# Patient Record
Sex: FEMALE | Race: OTHER | NOT HISPANIC OR LATINO
[De-identification: names, ages, dates, MRNs, and addresses within clinical notes are randomized per-mention and may not be internally consistent; named-entity substitution may affect disease eponyms.]

---

## 2020-01-02 ENCOUNTER — RESULT REVIEW (OUTPATIENT)
Age: 40
End: 2020-01-02

## 2020-05-15 PROBLEM — Z00.00 ENCOUNTER FOR PREVENTIVE HEALTH EXAMINATION: Status: ACTIVE | Noted: 2020-05-15

## 2020-05-20 ENCOUNTER — APPOINTMENT (OUTPATIENT)
Dept: BARIATRICS | Facility: CLINIC | Age: 40
End: 2020-05-20
Payer: COMMERCIAL

## 2020-05-20 VITALS — HEIGHT: 72 IN | WEIGHT: 293 LBS | BODY MASS INDEX: 39.68 KG/M2

## 2020-05-20 DIAGNOSIS — Z78.9 OTHER SPECIFIED HEALTH STATUS: ICD-10-CM

## 2020-05-20 DIAGNOSIS — G47.30 SLEEP APNEA, UNSPECIFIED: ICD-10-CM

## 2020-05-20 PROCEDURE — 99203 OFFICE O/P NEW LOW 30 MIN: CPT | Mod: 95

## 2020-05-20 NOTE — REVIEW OF SYSTEMS
[Recent Change In Weight] : ~T no recent weight change [Reflux/Heartburn] : reflux/heartburn [Negative] : Psychiatric [FreeTextEntry7] : hx of diverticulitis

## 2020-05-20 NOTE — HISTORY OF PRESENT ILLNESS
[Home] : at home, [unfilled] , at the time of the visit. [Other Location: e.g. Home (Enter Location, City,State)___] : at [unfilled] [Verbal consent obtained from patient] : the patient, [unfilled] [FreeTextEntry3] : self [de-identified] : 40 yo female who is seen by catia who is very intelligent history of diverticulitis who has clas 3 obesity that has gotten worse since last pregnancy.  Followed by Dr Kamara for 3 years who has been advising with weight loss along with PA.  They have placed her on phentermine without success and have tracked her diet for many months.  She has combined the meds with a variety of diets ranging from intermittent fasting to low calorie options.  She tracke her weight gain to following her iud insertion after her last child\par she walks regularly and is very committed.  There is nothing to be gained by further insurance mandated diet.  She has been considering surgery for long time and oriiginal referral was in october.  She has some signs of sleep apnea but no narcolepsy.  Does have gerd symptoms so will get egd

## 2020-05-20 NOTE — ASSESSMENT
[FreeTextEntry1] : for bariatric surgery \par get egd to check for reflux and esophagitis\par labs ordered Psych and nutrtion

## 2020-06-05 ENCOUNTER — APPOINTMENT (OUTPATIENT)
Dept: BARIATRICS | Facility: CLINIC | Age: 40
End: 2020-06-05
Payer: COMMERCIAL

## 2020-06-05 VITALS — BODY MASS INDEX: 40.69 KG/M2 | WEIGHT: 293 LBS

## 2020-06-05 PROCEDURE — 97802 MEDICAL NUTRITION INDIV IN: CPT | Mod: 95

## 2020-06-09 ENCOUNTER — TRANSCRIPTION ENCOUNTER (OUTPATIENT)
Age: 40
End: 2020-06-09

## 2020-06-24 ENCOUNTER — APPOINTMENT (OUTPATIENT)
Dept: BARIATRICS | Facility: CLINIC | Age: 40
End: 2020-06-24
Payer: COMMERCIAL

## 2020-06-24 VITALS — BODY MASS INDEX: 41.77 KG/M2 | WEIGHT: 293 LBS

## 2020-06-24 PROCEDURE — 97803 MED NUTRITION INDIV SUBSEQ: CPT | Mod: 95

## 2020-07-13 ENCOUNTER — FORM ENCOUNTER (OUTPATIENT)
Age: 40
End: 2020-07-13

## 2020-07-20 ENCOUNTER — LABORATORY RESULT (OUTPATIENT)
Age: 40
End: 2020-07-20

## 2020-07-21 VITALS — BODY MASS INDEX: 41.23 KG/M2 | WEIGHT: 293 LBS

## 2020-07-22 ENCOUNTER — APPOINTMENT (OUTPATIENT)
Dept: BARIATRICS | Facility: CLINIC | Age: 40
End: 2020-07-22
Payer: COMMERCIAL

## 2020-07-22 VITALS — WEIGHT: 293 LBS | BODY MASS INDEX: 39.68 KG/M2 | HEIGHT: 72 IN

## 2020-07-22 PROCEDURE — 99213 OFFICE O/P EST LOW 20 MIN: CPT

## 2020-07-22 NOTE — HISTORY OF PRESENT ILLNESS
[de-identified] : 38 yo female who is seen by catia who is very intelligent history of diverticulitis who has clas 3 obesity that has gotten worse since last pregnancy.  Followed by Dr Kamara for 3 years who has been advising with weight loss along with PA.  They have placed her on phentermine without success and have tracked her diet for many months.  She has combined the meds with a variety of diets ranging from intermittent fasting to low calorie options.  She tracke her weight gain to following her iud insertion after her last child\par she walks regularly and is very committed.  There is nothing to be gained by further insurance mandated diet.  She has been considering surgery for long time and oriiginal referral was in october.  She has some signs of sleep apnea but no narcolepsy.  egd no barretts\par for lap vsg \par all instructions given and questions answered\par risks and benefits explained

## 2020-07-24 ENCOUNTER — LABORATORY RESULT (OUTPATIENT)
Age: 40
End: 2020-07-24

## 2020-07-26 ENCOUNTER — TRANSCRIPTION ENCOUNTER (OUTPATIENT)
Age: 40
End: 2020-07-26

## 2020-07-27 ENCOUNTER — APPOINTMENT (OUTPATIENT)
Dept: BARIATRICS | Facility: HOSPITAL | Age: 40
End: 2020-07-27

## 2020-07-27 ENCOUNTER — INPATIENT (INPATIENT)
Facility: HOSPITAL | Age: 40
LOS: 0 days | Discharge: HOME | DRG: 621 | End: 2020-07-28
Attending: SURGERY | Admitting: SURGERY
Payer: COMMERCIAL

## 2020-07-27 ENCOUNTER — RESULT REVIEW (OUTPATIENT)
Age: 40
End: 2020-07-27

## 2020-07-27 VITALS
DIASTOLIC BLOOD PRESSURE: 83 MMHG | HEART RATE: 101 BPM | OXYGEN SATURATION: 98 % | SYSTOLIC BLOOD PRESSURE: 128 MMHG | RESPIRATION RATE: 18 BRPM | TEMPERATURE: 97 F | HEIGHT: 72 IN | WEIGHT: 293 LBS

## 2020-07-27 DIAGNOSIS — Z41.9 ENCOUNTER FOR PROCEDURE FOR PURPOSES OTHER THAN REMEDYING HEALTH STATE, UNSPECIFIED: Chronic | ICD-10-CM

## 2020-07-27 PROCEDURE — 88342 IMHCHEM/IMCYTCHM 1ST ANTB: CPT | Mod: 26

## 2020-07-27 PROCEDURE — 43775 LAP SLEEVE GASTRECTOMY: CPT

## 2020-07-27 PROCEDURE — 39541 REPAIR OF DIAPHRAGM HERNIA: CPT

## 2020-07-27 PROCEDURE — 88307 TISSUE EXAM BY PATHOLOGIST: CPT | Mod: 26

## 2020-07-27 RX ORDER — GABAPENTIN 400 MG/1
300 CAPSULE ORAL ONCE
Refills: 0 | Status: COMPLETED | OUTPATIENT
Start: 2020-07-27 | End: 2020-07-27

## 2020-07-27 RX ORDER — ONDANSETRON 8 MG/1
4 TABLET, FILM COATED ORAL EVERY 6 HOURS
Refills: 0 | Status: DISCONTINUED | OUTPATIENT
Start: 2020-07-27 | End: 2020-07-28

## 2020-07-27 RX ORDER — KETOROLAC TROMETHAMINE 30 MG/ML
30 SYRINGE (ML) INJECTION EVERY 6 HOURS
Refills: 0 | Status: DISCONTINUED | OUTPATIENT
Start: 2020-07-27 | End: 2020-07-28

## 2020-07-27 RX ORDER — ACETAMINOPHEN 500 MG
1000 TABLET ORAL ONCE
Refills: 0 | Status: COMPLETED | OUTPATIENT
Start: 2020-07-27 | End: 2020-07-27

## 2020-07-27 RX ORDER — SCOPALAMINE 1 MG/3D
1 PATCH, EXTENDED RELEASE TRANSDERMAL ONCE
Refills: 0 | Status: COMPLETED | OUTPATIENT
Start: 2020-07-27 | End: 2020-07-27

## 2020-07-27 RX ORDER — ACETAMINOPHEN 500 MG
650 TABLET ORAL EVERY 6 HOURS
Refills: 0 | Status: DISCONTINUED | OUTPATIENT
Start: 2020-07-27 | End: 2020-07-28

## 2020-07-27 RX ORDER — ENOXAPARIN SODIUM 100 MG/ML
40 INJECTION SUBCUTANEOUS ONCE
Refills: 0 | Status: COMPLETED | OUTPATIENT
Start: 2020-07-27 | End: 2020-07-27

## 2020-07-27 RX ORDER — SODIUM CHLORIDE 9 MG/ML
1000 INJECTION, SOLUTION INTRAVENOUS
Refills: 0 | Status: DISCONTINUED | OUTPATIENT
Start: 2020-07-27 | End: 2020-07-28

## 2020-07-27 RX ORDER — HYDROMORPHONE HYDROCHLORIDE 2 MG/ML
0.5 INJECTION INTRAMUSCULAR; INTRAVENOUS; SUBCUTANEOUS
Refills: 0 | Status: DISCONTINUED | OUTPATIENT
Start: 2020-07-27 | End: 2020-07-27

## 2020-07-27 RX ORDER — KETOROLAC TROMETHAMINE 30 MG/ML
15 SYRINGE (ML) INJECTION ONCE
Refills: 0 | Status: DISCONTINUED | OUTPATIENT
Start: 2020-07-27 | End: 2020-07-27

## 2020-07-27 RX ADMIN — Medication 1000 MILLIGRAM(S): at 22:41

## 2020-07-27 RX ADMIN — Medication 1000 MILLIGRAM(S): at 09:36

## 2020-07-27 RX ADMIN — Medication 650 MILLIGRAM(S): at 16:03

## 2020-07-27 RX ADMIN — HYDROMORPHONE HYDROCHLORIDE 0.5 MILLIGRAM(S): 2 INJECTION INTRAMUSCULAR; INTRAVENOUS; SUBCUTANEOUS at 15:00

## 2020-07-27 RX ADMIN — Medication 650 MILLIGRAM(S): at 15:35

## 2020-07-27 RX ADMIN — SODIUM CHLORIDE 180 MILLILITER(S): 9 INJECTION, SOLUTION INTRAVENOUS at 15:03

## 2020-07-27 RX ADMIN — SCOPALAMINE 1 PATCH: 1 PATCH, EXTENDED RELEASE TRANSDERMAL at 09:37

## 2020-07-27 RX ADMIN — ENOXAPARIN SODIUM 40 MILLIGRAM(S): 100 INJECTION SUBCUTANEOUS at 09:36

## 2020-07-27 RX ADMIN — GABAPENTIN 300 MILLIGRAM(S): 400 CAPSULE ORAL at 09:36

## 2020-07-27 RX ADMIN — Medication 15 MILLIGRAM(S): at 16:03

## 2020-07-27 RX ADMIN — HYDROMORPHONE HYDROCHLORIDE 0.5 MILLIGRAM(S): 2 INJECTION INTRAMUSCULAR; INTRAVENOUS; SUBCUTANEOUS at 14:45

## 2020-07-27 RX ADMIN — SCOPALAMINE 1 PATCH: 1 PATCH, EXTENDED RELEASE TRANSDERMAL at 20:08

## 2020-07-27 RX ADMIN — Medication 400 MILLIGRAM(S): at 20:50

## 2020-07-27 RX ADMIN — Medication 15 MILLIGRAM(S): at 15:30

## 2020-07-27 RX ADMIN — SODIUM CHLORIDE 180 MILLILITER(S): 9 INJECTION, SOLUTION INTRAVENOUS at 20:50

## 2020-07-27 RX ADMIN — ONDANSETRON 4 MILLIGRAM(S): 8 TABLET, FILM COATED ORAL at 20:50

## 2020-07-27 NOTE — PROGRESS NOTE ADULT - ASSESSMENT
40F w/ hx MO, GERD, miscarriage s/p D&C, no abdominal surgical hx, presents for laparoscopic sleeve gastrectomy and hiatal hernia repair    - nausea/pain control  -BCLD  Protonix  SCd's, OOBA, IS  AM labs  ISS  Nutritional consult AM

## 2020-07-27 NOTE — BRIEF OPERATIVE NOTE - NSICDXBRIEFPROCEDURE_GEN_ALL_CORE_FT
PROCEDURES:  Laparoscopic repair of sliding hiatal hernia 27-Jul-2020 14:09:18  Hawk Mcintosh  Gastrectomy, sleeve 27-Jul-2020 14:08:14  Hawk Mcintosh

## 2020-07-27 NOTE — BRIEF OPERATIVE NOTE - OPERATION/FINDINGS
Stomach divided along Bougie edge using buttressed Covidien stapler. Hiatal hernia repaired w/ proline. Hemostasis achieved. Fascia at 15mm post site closed. Port sites closed w/ 4-0 Monocryl sutures & surgical glue.

## 2020-07-27 NOTE — H&P ADULT - HISTORY OF PRESENT ILLNESS
40F w/ hx MO, GERD, miscarriage s/p D&C, no abdominal surgical hx, presents for laparoscopic sleeve gastrectomy. Denies any complaints at this time.

## 2020-07-27 NOTE — PROGRESS NOTE ADULT - SUBJECTIVE AND OBJECTIVE BOX
POST-OPERATIVE NOTE    Procedure: Sleeve gastrectomy and hiatal hernia repair    Diagnosis/Indication: MO and hiatal hernia    Surgeon: Dr. Espitia     S: Pt has no complaints besides been a little anxious. Denies nausea, vomit, CP, SOB, REID, calf tenderness. Pain controlled with medication.    O:  T(C): 36.8 (07-27-20 @ 16:05), Max: 36.8 (07-27-20 @ 16:05)  T(F): 98.2 (07-27-20 @ 16:05), Max: 98.2 (07-27-20 @ 16:05)  HR: 99 (07-27-20 @ 16:05) (95 - 103)  BP: 155/81 (07-27-20 @ 16:05) (135/77 - 159/88)  RR: 80 (07-27-20 @ 16:05) (11 - 80)  SpO2: 100% (07-27-20 @ 16:05) (94% - 100%)  Wt(kg): --            Gen: NAD, resting comfortably in bed  C/V: NSR  Pulm: Nonlabored breathing, no respiratory distress  Abd: soft, NT/ND  Extrem: WWP, no calf edema, SCDs in place

## 2020-07-27 NOTE — BRIEF OPERATIVE NOTE - NSICDXBRIEFPOSTOP_GEN_ALL_CORE_FT
POST-OP DIAGNOSIS:  Hiatal hernia 27-Jul-2020 14:09:32  Hawk Mcintosh  Morbid obesity 27-Jul-2020 14:08:34  Hawk Mcintosh

## 2020-07-27 NOTE — H&P ADULT - BIRTH SEX
Patient:   MARISSA CORBIN            MRN: [id number]            FIN: [id number]               Age:   4 years     Sex:  MALE     :  13   Associated Diagnoses:   None   Author:   JACI JIMENEZ      Discharge Summary    Admission Date:  9/15/17    Discharge Date: 17    Admitting Diagnoses:  URI with bronchospasm      Final Diagnoses:  viral upper respiratory tract infections with moderate bronchospasm; acute respiratory failure      Pertinent Labs/Imaging Findings:   9/15 XR CHEST PORTABLE 1V  IMPRESSION: Normal heart size and lung volumes. Lungs are clear. No effusion or pneumothorax. Normal bones          Hospital Course:  Marissa is a 4 year old previously healthy male presenting with 3 days of viral URI symptoms and one day of wheezing and increased work of breathing. His mother states that he has had cough, sneezing, and intermittent posttussive emesis for the past 3 days. Denies fevers. One day prior to admission, he began to have wheezing sounds and grunting, so his mother brought him to the ER. His mother has a stomach virus last week, but no other sick contacts. No recent travel. He has generally been eating and drinking well with slightly decreased PO intake the day of admission. Denies nausea/vomiting/diarrhea rash. He complained of sore throat for 2-3 days, but currently symptoms have resolved.  In the ER, he presented with wheezing and increased WOB, with RR in 40-50s. He received 1 hr long Duoneb followed by 1 hr long Albuterol then spaced to q2h. Received prednisolone 50 mg x 1. CXR was negative. He was then admitted to the floor for continued respiratory management. On the floor, patient successfully weaned to Albuterol Q3hr at 11am, and then Q4hr at 2pm. Patient's behaving hyperactive per parents.   Patient remained stable and tolerated Q4 hr albuterol.   Patient symptoms improved and patient reported back to baseline.   Patient clinically stable for discharge home  with close outpatient follow up with PMD       Condition on Discharge:   Stable    Discharge Instructions:   PMD and Follow Up Appointment: Dr. Dyana Layne in 1-4 days      Consultants and Consultant Discharge Recs/Follow Up Appoinments:      Labs/Imaging to be done or followed up as outpatient:      Medications:  Albuterol, 4 puffs every 4 hrs while awake for 2 more days  Orapred 30 mg orally twice a day for 2-3 more days     Diet:  Resume previous    Activity:  No restrictions, resume previous as tolerated    Special Instructions:  Immediately go to Emergency Room if wheezing worsens, is no longer improved with medication, his lips or fingers turns blue, has difficulty eating or drinking, has severe chest pain, or coughing becomes constant.   Call Pediatrician if new symptoms occur.    Discharge Summary faxed to PMD. Thank you for allowing us to participate in the care of this patient.                 Electronically Signed On 09/17/2017 19:57  __________________________________________________   JACI JIMENEZ     Female

## 2020-07-27 NOTE — H&P ADULT - ASSESSMENT
40F w/ hx MO, GERD, miscarriage s/p D&C, no abdominal surgical hx, presents for laparoscopic sleeve gastrectomy.     - proceed to surgery

## 2020-07-28 ENCOUNTER — TRANSCRIPTION ENCOUNTER (OUTPATIENT)
Age: 40
End: 2020-07-28

## 2020-07-28 VITALS — WEIGHT: 159.84 LBS

## 2020-07-28 LAB
ANION GAP SERPL CALC-SCNC: 10 MMOL/L — SIGNIFICANT CHANGE UP (ref 5–17)
BUN SERPL-MCNC: 9 MG/DL — SIGNIFICANT CHANGE UP (ref 7–23)
CALCIUM SERPL-MCNC: 8.9 MG/DL — SIGNIFICANT CHANGE UP (ref 8.4–10.5)
CHLORIDE SERPL-SCNC: 100 MMOL/L — SIGNIFICANT CHANGE UP (ref 96–108)
CO2 SERPL-SCNC: 23 MMOL/L — SIGNIFICANT CHANGE UP (ref 22–31)
CREAT SERPL-MCNC: 0.95 MG/DL — SIGNIFICANT CHANGE UP (ref 0.5–1.3)
GLUCOSE SERPL-MCNC: 109 MG/DL — HIGH (ref 70–99)
HCT VFR BLD CALC: 39.1 % — SIGNIFICANT CHANGE UP (ref 34.5–45)
HGB BLD-MCNC: 13.1 G/DL — SIGNIFICANT CHANGE UP (ref 11.5–15.5)
MAGNESIUM SERPL-MCNC: 1.8 MG/DL — SIGNIFICANT CHANGE UP (ref 1.6–2.6)
MCHC RBC-ENTMCNC: 30 PG — SIGNIFICANT CHANGE UP (ref 27–34)
MCHC RBC-ENTMCNC: 33.5 GM/DL — SIGNIFICANT CHANGE UP (ref 32–36)
MCV RBC AUTO: 89.5 FL — SIGNIFICANT CHANGE UP (ref 80–100)
NRBC # BLD: 0 /100 WBCS — SIGNIFICANT CHANGE UP (ref 0–0)
PHOSPHATE SERPL-MCNC: 3.1 MG/DL — SIGNIFICANT CHANGE UP (ref 2.5–4.5)
PLATELET # BLD AUTO: 202 K/UL — SIGNIFICANT CHANGE UP (ref 150–400)
POTASSIUM SERPL-MCNC: 4.2 MMOL/L — SIGNIFICANT CHANGE UP (ref 3.5–5.3)
POTASSIUM SERPL-SCNC: 4.2 MMOL/L — SIGNIFICANT CHANGE UP (ref 3.5–5.3)
RBC # BLD: 4.37 M/UL — SIGNIFICANT CHANGE UP (ref 3.8–5.2)
RBC # FLD: 13 % — SIGNIFICANT CHANGE UP (ref 10.3–14.5)
SODIUM SERPL-SCNC: 133 MMOL/L — LOW (ref 135–145)
WBC # BLD: 8.66 K/UL — SIGNIFICANT CHANGE UP (ref 3.8–10.5)
WBC # FLD AUTO: 8.66 K/UL — SIGNIFICANT CHANGE UP (ref 3.8–10.5)

## 2020-07-28 RX ORDER — PANTOPRAZOLE SODIUM 20 MG/1
40 TABLET, DELAYED RELEASE ORAL DAILY
Refills: 0 | Status: DISCONTINUED | OUTPATIENT
Start: 2020-07-28 | End: 2020-07-28

## 2020-07-28 RX ORDER — OMEPRAZOLE 10 MG/1
1 CAPSULE, DELAYED RELEASE ORAL
Qty: 30 | Refills: 0
Start: 2020-07-28 | End: 2020-08-26

## 2020-07-28 RX ORDER — APIXABAN 2.5 MG/1
1 TABLET, FILM COATED ORAL
Qty: 60 | Refills: 0
Start: 2020-07-28 | End: 2020-08-26

## 2020-07-28 RX ORDER — PANTOPRAZOLE SODIUM 20 MG/1
1 TABLET, DELAYED RELEASE ORAL
Qty: 0 | Refills: 0 | DISCHARGE

## 2020-07-28 RX ORDER — ACETAMINOPHEN 500 MG
2 TABLET ORAL
Qty: 84 | Refills: 0
Start: 2020-07-28 | End: 2020-08-10

## 2020-07-28 RX ORDER — ACETAMINOPHEN 500 MG
1000 TABLET ORAL ONCE
Refills: 0 | Status: COMPLETED | OUTPATIENT
Start: 2020-07-28 | End: 2020-07-28

## 2020-07-28 RX ORDER — SODIUM CHLORIDE 9 MG/ML
1000 INJECTION, SOLUTION INTRAVENOUS
Refills: 0 | Status: DISCONTINUED | OUTPATIENT
Start: 2020-07-28 | End: 2020-07-28

## 2020-07-28 RX ORDER — METOCLOPRAMIDE HCL 10 MG
10 TABLET ORAL ONCE
Refills: 0 | Status: COMPLETED | OUTPATIENT
Start: 2020-07-28 | End: 2020-07-28

## 2020-07-28 RX ORDER — MAGNESIUM SULFATE 500 MG/ML
2 VIAL (ML) INJECTION ONCE
Refills: 0 | Status: DISCONTINUED | OUTPATIENT
Start: 2020-07-28 | End: 2020-07-28

## 2020-07-28 RX ADMIN — Medication 400 MILLIGRAM(S): at 04:52

## 2020-07-28 RX ADMIN — Medication 30 MILLIGRAM(S): at 07:58

## 2020-07-28 RX ADMIN — ONDANSETRON 4 MILLIGRAM(S): 8 TABLET, FILM COATED ORAL at 07:58

## 2020-07-28 RX ADMIN — SCOPALAMINE 1 PATCH: 1 PATCH, EXTENDED RELEASE TRANSDERMAL at 06:29

## 2020-07-28 RX ADMIN — Medication 10 MILLIGRAM(S): at 00:39

## 2020-07-28 RX ADMIN — Medication 30 MILLIGRAM(S): at 00:52

## 2020-07-28 RX ADMIN — Medication 30 MILLIGRAM(S): at 00:39

## 2020-07-28 RX ADMIN — Medication 1000 MILLIGRAM(S): at 05:49

## 2020-07-28 NOTE — DISCHARGE NOTE PROVIDER - CARE PROVIDER_API CALL
Alejandro Espitia S  SURGERY  [address] [place] [address]  Phone: [phone]  Fax: [phone]  Follow Up Time: Alejandro Espitia S  SURGERY  [address] [place] [address]  Phone: [phone]  Fax: [phone]  Follow Up Time: 1 week

## 2020-07-28 NOTE — DISCHARGE NOTE PROVIDER - NSDCMRMEDTOKEN_GEN_ALL_CORE_FT
Eliquis 2.5 mg oral tablet: 1 tab(s) orally 2 times a day MDD:2 tabs    Start taking Eliquis July 30, 2020 in the morning.   omeprazole 40 mg oral delayed release capsule: 1 cap(s) orally once a day MDD:1 tab  Tylenol 325 mg oral tablet: 2 tab(s) orally every 8 hours, As Needed -for moderate pain for severe pain MDD:4000 mg

## 2020-07-28 NOTE — DISCHARGE NOTE PROVIDER - NSDCCPCAREPLAN_GEN_ALL_CORE_FT
PRINCIPAL DISCHARGE DIAGNOSIS  Diagnosis: Morbidly obese  Assessment and Plan of Treatment: 40F w/ hx MO, GERD, miscarriage s/p D&C, no abdominal surgical hx, presents for laparoscopic sleeve gastrectomy and hiatal hernia repair. Patient tolerated well the procedure that was without complications. Patient is accepting the diet, feeling well, ambulating, without nausea, vomit, CP, SOB, calf tenderness. Patient is stable and ready to be discharged home with a follow up appointment.         SECONDARY DISCHARGE DIAGNOSES  Diagnosis: Hiatal hernia with GERD  Assessment and Plan of Treatment: PRINCIPAL DISCHARGE DIAGNOSIS  Diagnosis: Morbidly obese  Assessment and Plan of Treatment: 40 year old female with history of MO (BMI 43) , GERD, miscarriage s/p D&C, no abdominal surgical hx, presents for elective bariatric surgery. 7/27 patient s/p  laparoscopic sleeve gastrectomy and hiatal hernia repair. Patient tolerated well the procedure that was without complications. Patient is accepting the diet, feeling well, ambulating, without nausea, vomit, CP, SOB, calf tenderness. Patient is stable and ready to be discharged home with a follow up appointment.   1)  Please take Tylenol 650 mg every 4 to 6 hours by mouth for moderate pain control.  Please do not exceed over 4,000 mg of Tylenol a day.   2) Please start taking Eliquis 2.5 mg by mouth twice a day starting Thursday July 30, 2020 for 30 days.  3) Please take Omeprazole 40 mg once a day by mouth.        SECONDARY DISCHARGE DIAGNOSES  Diagnosis: Hiatal hernia with GERD  Assessment and Plan of Treatment:

## 2020-07-28 NOTE — DISCHARGE NOTE NURSING/CASE MANAGEMENT/SOCIAL WORK - PATIENT PORTAL LINK FT
You can access the FollowMyHealth Patient Portal offered by [facility] by registering at the following website: [email or web address]. By joining [facility]’s FollowMyHealth portal, you will also be able to view your health information using other applications (apps) compatible with our system.

## 2020-07-28 NOTE — DIETITIAN INITIAL EVALUATION ADULT. - OTHER INFO
with hx of admitting for elective lap sleeve gastrectomy (7/27), now POD #1. On assessment, pt resting in bed. Currently on BARICLLIQ diet, tolerating PO. Pt had adequate PO intake this morning, consuming 4oz within first hour of waking up. Pain and nausea well controlled. Discussed volumes of various cup sizes on tray table and encouraged aiming for 4 oz/hr as tolerated. Prepared with protein shakes, and vitamins for after discharge. RD provided indepth edu on diet advancement and specific nutrient needs s/p LSG. NKFA. No dietary restrictions at home. Skin: surgical incisions. GI: WDL per flowsheet. RD to follow up per protocol.

## 2020-07-28 NOTE — DIETITIAN INITIAL EVALUATION ADULT. - ENERGY NEEDS
Height: 72" Weight: 317lbs, IBW 160lbs+/-10%, %%, BMI 43.0 kg/m2  Above energy needs calculated for wt maintenance (20-25kcal/kg) using IBW (73kg)  Weeks 1-2 estimated needs: 730-880kcal/day (10-12kcal/kg), 88-110g pro/day (1.2-1.5g/kg), >/=64oz clear fluids.

## 2020-07-28 NOTE — DISCHARGE NOTE PROVIDER - NSDCFUADDINST_GEN_ALL_CORE_FT
Follow up with Dr. Espitia in 1 week. Call the office at  to schedule your appointment.     You may shower; soap and water over incision sites. Do not scrub. Pat dry when done. No tub bathing or swimming until cleared. Keep incision sites out of the sun as scars will darken.     No heavy lifting (>10lbs) or strenuous exercise. Diet: Bariatric Full Fluids. 60 grams protein daily.  64 fluid ounces water daily.   Drink small sips throughout the day. Continue diet as outlined by paperwork received as a pre-operative patient.   You should be urinating at least 3-4x per day.   Call the office if you experience increasing abdominal pain, nausea, vomiting, or temperature >100.4F.    NO ASPIRIN OR NSAIDs until approved by Dr. Espitia.   Avoid alcoholic beverages until cleared by Dr. Espitia.    - Place take Tylenol 325mg 2 tabs by mouth every 6 hours as needed for moderate to severe pain control. Please do not exceed more than 4000 mg of tylenol a day.   - Please take omeprazol 40 mg once a day by mouth in the morning before meals, for 30 days. 1)  Please take Tylenol 650 mg every 4 to 6 hours by mouth for moderate pain control.  Please do not exceed over 4,000 mg of Tylenol a day.   2) Please start taking Eliquis 2.5 mg by mouth twice a day starting Thursday July 30, 2020 for 30 days.  3) Please take Omeprazole 40 mg once a day by mouth.    Follow up with Dr. Espitia in 1 week. Call the office at  to schedule your appointment. You may shower; soap and water over incision sites. Do not scrub. Pat dry when done. No tub bathing or swimming until cleared. Keep incision sites out of the sun as scars will darken. No heavy lifting (>10lbs) or strenuous exercise. Diet: Bariatric Full Fluids. 60 grams protein daily.  64 fluid ounces water daily. Drink small sips throughout the day. Continue diet as outlined by paperwork received as a pre-operative patient. You should be urinating at least 3-4x per day. Call the office if you experience increasing abdominal pain, nausea, vomiting, or temperature >100.4F.  NO ASPIRIN OR NSAIDs until approved by Dr. Espitia. Avoid alcoholic beverages until cleared by Dr. Espitia.

## 2020-07-28 NOTE — PROGRESS NOTE ADULT - ASSESSMENT
40F w/ hx MO, GERD, miscarriage s/p D&C, no abdominal surgical hx, presents for laparoscopic sleeve gastrectomy and hiatal hernia repair 7/27 POD 1    Pain/Nausea Control   BCLD, IVF LR 180cc/hr  Protonix  ISS  Eliquis POD 3 x 30D  SCDs, OOBA, IS  AM labs  Nutritional consult AM

## 2020-07-28 NOTE — DISCHARGE NOTE PROVIDER - HOSPITAL COURSE
40F w/ hx MO, GERD, miscarriage s/p D&C, no abdominal surgical hx, presents for laparoscopic sleeve gastrectomy and hiatal hernia repair. Patient tolerated well the procedure that was without complications. Patient is accepting the diet, feeling well, ambulating, without nausea, vomit, CP, SOB, calf tenderness. Patient is stable and ready to be discharged home with a follow up appointment. 40 year old female with history of MO (BMI 43) , GERD, miscarriage s/p D&C, no abdominal surgical hx, presents for elective bariatric surgery. 7/27 patient s/p  laparoscopic sleeve gastrectomy and hiatal hernia repair. Patient tolerated well the procedure that was without complications. Patient is accepting the diet, feeling well, ambulating, without nausea, vomit, CP, SOB, calf tenderness. Patient is stable and ready to be discharged home with a follow up appointment.

## 2020-07-28 NOTE — PROGRESS NOTE ADULT - SUBJECTIVE AND OBJECTIVE BOX
SUBJECTIVE: Pt seen and examined by chief resident. Patient reports one episode of nausea and vomiting overnight, since then no further episode of vomiting or nausea. Patient reports minimal abdominal pain, urinating adequately, and ambulating out of bed to the bathroom. Patient denies fever, chills, chest pain or shortness of breathe.     MEDICATIONS  (STANDING):  lactated ringers. 1000 milliLiter(s) (90 mL/Hr) IV Continuous <Continuous>  pantoprazole  Injectable 40 milliGRAM(s) IV Push daily    MEDICATIONS  (PRN):  acetaminophen   Tablet .. 650 milliGRAM(s) Oral every 6 hours PRN Mild Pain (1 - 3)  ketorolac   Injectable 30 milliGRAM(s) IV Push every 6 hours PRN Moderate Pain (4 - 6)  ondansetron Injectable 4 milliGRAM(s) IV Push every 6 hours PRN Nausea      Vital Signs Last 24 Hrs  T(C): 36.8 (28 Jul 2020 05:38), Max: 36.8 (27 Jul 2020 16:05)  T(F): 98.3 (28 Jul 2020 05:38), Max: 98.3 (28 Jul 2020 05:38)  HR: 104 (28 Jul 2020 05:38) (90 - 104)  BP: 124/82 (28 Jul 2020 05:38) (124/82 - 159/88)  BP(mean): 115 (27 Jul 2020 16:05) (100 - 118)  RR: 16 (28 Jul 2020 05:38) (11 - 18)  SpO2: 98% (28 Jul 2020 05:38) (94% - 100%)    Physical Exam:  GENERAL: NAD, Resting comfortably in bed  RESP: Nonlabored breathing, No respiratory distress  CARD: Normal rate, Normal peripheral perfusion  GI: Obese, soft, NT, ND, no guarding, no rebound tenderness, incisions are clean/dry without drainage/intact, no hematoma  EXTREM: WWP, No edema, SCDs in place    I&O's Summary    27 Jul 2020 07:01  -  28 Jul 2020 07:00  --------------------------------------------------------  IN: 2520 mL / OUT: 1150 mL / NET: 1370 mL        LABS:                        13.1   8.66  )-----------( 202 ( 28 Jul 2020 07:26 )             39.1               CAPILLARY BLOOD GLUCOSE

## 2020-07-28 NOTE — DISCHARGE NOTE PROVIDER - NSDCCPGOAL_GEN_ALL_CORE_FT
To get better and follow your care plan as instructed. To get better and follow your care plan as instructed.  1)  Please take Tylenol 650 mg every 4 to 6 hours by mouth for moderate pain control.  Please do not exceed over 4,000 mg of Tylenol a day.   2) Please start taking Eliquis 2.5 mg by mouth twice a day starting Thursday July 30, 2020 for 30 days.  3) Please take Omeprazole 40 mg once a day by mouth.

## 2020-07-30 PROBLEM — K21.9 GASTRO-ESOPHAGEAL REFLUX DISEASE WITHOUT ESOPHAGITIS: Chronic | Status: ACTIVE | Noted: 2020-07-24

## 2020-07-30 PROBLEM — E66.9 OBESITY, UNSPECIFIED: Chronic | Status: ACTIVE | Noted: 2020-07-24

## 2020-07-30 PROCEDURE — 84100 ASSAY OF PHOSPHORUS: CPT

## 2020-07-30 PROCEDURE — 83735 ASSAY OF MAGNESIUM: CPT

## 2020-07-30 PROCEDURE — 88307 TISSUE EXAM BY PATHOLOGIST: CPT

## 2020-07-30 PROCEDURE — 88341 IMHCHEM/IMCYTCHM EA ADD ANTB: CPT

## 2020-07-30 PROCEDURE — 86901 BLOOD TYPING SEROLOGIC RH(D): CPT

## 2020-07-30 PROCEDURE — 36415 COLL VENOUS BLD VENIPUNCTURE: CPT

## 2020-07-30 PROCEDURE — 80048 BASIC METABOLIC PNL TOTAL CA: CPT

## 2020-07-30 PROCEDURE — 86850 RBC ANTIBODY SCREEN: CPT

## 2020-07-30 PROCEDURE — 85027 COMPLETE CBC AUTOMATED: CPT

## 2020-07-30 PROCEDURE — C1889: CPT

## 2020-07-31 DIAGNOSIS — E66.01 MORBID (SEVERE) OBESITY DUE TO EXCESS CALORIES: ICD-10-CM

## 2020-07-31 DIAGNOSIS — K44.9 DIAPHRAGMATIC HERNIA WITHOUT OBSTRUCTION OR GANGRENE: ICD-10-CM

## 2020-07-31 DIAGNOSIS — K21.9 GASTRO-ESOPHAGEAL REFLUX DISEASE WITHOUT ESOPHAGITIS: ICD-10-CM

## 2020-08-03 LAB — SURGICAL PATHOLOGY STUDY: SIGNIFICANT CHANGE UP

## 2020-08-05 ENCOUNTER — APPOINTMENT (OUTPATIENT)
Dept: BARIATRICS | Facility: CLINIC | Age: 40
End: 2020-08-05
Payer: COMMERCIAL

## 2020-08-05 VITALS — HEIGHT: 72 IN | WEIGHT: 284 LBS | BODY MASS INDEX: 38.47 KG/M2

## 2020-08-05 PROCEDURE — 99024 POSTOP FOLLOW-UP VISIT: CPT

## 2020-08-05 NOTE — ADDENDUM
[FreeTextEntry1] : I, Marcia Santos, acted solely as a scribe for Dr. Alejandro Espitia on 08/05/2020.

## 2020-08-05 NOTE — END OF VISIT
[FreeTextEntry3] : All medical record entries made by the Scribe were at my, Dr. Alejandro Espitia, direction and personally dictated by me on 08/05/2020. I have reviewed the chart and agree that the record accurately reflects my personal performance of the history, physical exam, assessment and plan. I have also personally directed, reviewed, and agreed with the chart.

## 2020-08-05 NOTE — HISTORY OF PRESENT ILLNESS
[Home] : at home, [unfilled] , at the time of the visit. [Medical Office: ([facility])___] : at the medical office located in  [Verbal consent obtained from patient] : the patient, [unfilled] [de-identified] : s/p laparoscopic VSG on 07/27/2020\par doing well\par only complaint is constipation, discussed remedies with her\par will follow up in 4 weeks

## 2020-09-02 ENCOUNTER — APPOINTMENT (OUTPATIENT)
Dept: BARIATRICS | Facility: CLINIC | Age: 40
End: 2020-09-02
Payer: COMMERCIAL

## 2020-09-02 VITALS — WEIGHT: 267 LBS | BODY MASS INDEX: 36.16 KG/M2 | HEIGHT: 72 IN

## 2020-09-02 PROCEDURE — 99024 POSTOP FOLLOW-UP VISIT: CPT

## 2020-09-02 NOTE — END OF VISIT
[FreeTextEntry3] : All medical record entries made by the Scribe were at my, Dr. Espitia's, discretion and personally dictated by me on 08/19/2020. I have reviewed the chart and agree that the record accurately reflects my personal performance of the history, physical exam, assessment and plan. I have also personally directed, reviewed and agreed to the chart.

## 2020-09-02 NOTE — ADDENDUM
[FreeTextEntry1] : This note was written by Mechelle Stringer on 08/19/2020 acting as scribe for Dr. Espitia.

## 2020-09-02 NOTE — ASSESSMENT
[FreeTextEntry1] : Milka Bond returns now 5 weeks post sleeve. Uneventful postoperative course. She will return to see us in approximately 3 months.

## 2020-09-02 NOTE — HISTORY OF PRESENT ILLNESS
[de-identified] : Milka Bond returns now 5 weeks post sleeve. She is down 33 lbs. Uneventful postoperative course. The importance of structure in diet and exercise were explained. No issues. She will return to see us in approximately 3 months.

## 2021-02-17 ENCOUNTER — APPOINTMENT (OUTPATIENT)
Dept: BARIATRICS | Facility: CLINIC | Age: 41
End: 2021-02-17
Payer: COMMERCIAL

## 2021-02-17 VITALS — HEIGHT: 72 IN | BODY MASS INDEX: 31.56 KG/M2 | WEIGHT: 233 LBS

## 2021-02-17 PROCEDURE — 99442: CPT

## 2021-02-17 NOTE — ASSESSMENT
[FreeTextEntry1] : 40 yr old female 7 months s/p sleeve gastrectomy. Pt is down approximately 75lbs, denies pain, n/v, heartburn or PO intolerances. Discussed importance of healthy dietary habits and doing both cardio and strength training to continue progression.

## 2021-02-17 NOTE — PLAN
[FreeTextEntry1] : Labs ordered, will f/u with results\par F/u with our dietician to go over dietary habits to help continue progression\par Add cardio to daily exercise regimen\par F/u in 3 months \par

## 2021-02-17 NOTE — HISTORY OF PRESENT ILLNESS
[Home] : at home, [unfilled] , at the time of the visit. [Medical Office: ([facility])___] : at the medical office located in  [Verbal consent obtained from patient] : the patient, [unfilled] [de-identified] : Ms. Bond is a 40yr old female following up with us for obesity and 7 months s/p sleeve gastrectomy. Pt is doing well, only complains of weight slowing down in the last couple of months. Stated she has only last approximately 15lbs since October. Explained to pt that around 7-9 months weight begins to stabilize and at this time dietary habits and exercise are very important to boost metabolism and continue progression. Denies pain, n/v, daily heartburn or PO intolerances. Trying to eat a healthy diet with adequate protein intake but states she does not eat vegetables often. Pt does exercises such as squats, core and arm exercises but admits to not walking as much for cardio. Otherwise doing well and taking all daily vitamins.

## 2021-02-18 ENCOUNTER — APPOINTMENT (OUTPATIENT)
Dept: BARIATRICS | Facility: CLINIC | Age: 41
End: 2021-02-18
Payer: COMMERCIAL

## 2021-02-18 VITALS — WEIGHT: 233 LBS | BODY MASS INDEX: 31.6 KG/M2

## 2021-02-18 DIAGNOSIS — E66.01 MORBID (SEVERE) OBESITY DUE TO EXCESS CALORIES: ICD-10-CM

## 2021-02-18 DIAGNOSIS — E88.81 METABOLIC SYNDROME: ICD-10-CM

## 2021-02-18 DIAGNOSIS — Z98.84 BARIATRIC SURGERY STATUS: ICD-10-CM

## 2021-02-18 PROCEDURE — 97803 MED NUTRITION INDIV SUBSEQ: CPT | Mod: 95

## 2023-11-27 NOTE — DISCHARGE NOTE NURSING/CASE MANAGEMENT/SOCIAL WORK - NSDCPEELIQUISREACT_GEN_ALL_CORE
[facility] Whaley from prior [address] [place] [address],[address] called and left [facility] that patient needed expedited review for a procedure-no return phone number to call back for more information Apixaban/Eliquis increases your risk for bleeding. Notify your doctor if you experience any of the following side effects: bleeding, coughing or vomiting blood, red or black stool, unexpected pain or swelling, itching or hives, chest pain, chest tightness, trouble breathing, changes in how much or how often you urinate, red or pink urine, numbness or tingling in your feet, or unusual muscle weakness. When Apixaban/Eliquis is taken with other medicines, they can affect how it works. Taking other medications such as aspirin, blood thinners, nonsteroidal anti-inflammatories, and medications that treat depression can increase your risk of bleeding. It is very important to tell your health care provider about all of the other medicines, including over-the-counter medications, herbs, and vitamins you are taking. DO NOT start, stop, or change the dosage of any medicine, including over-the-counter medicines, vitamins, and herbal products without your doctor’s approval. Any products containing aspirin or are nonsteroidal anti-inflammatories lessen the blood’s ability to form clots and add to the effect of Apixaban/Eliquis. Never take aspirin or medicines that contain aspirin without speaking to your doctor.